# Patient Record
Sex: FEMALE | Race: OTHER | URBAN - METROPOLITAN AREA
[De-identification: names, ages, dates, MRNs, and addresses within clinical notes are randomized per-mention and may not be internally consistent; named-entity substitution may affect disease eponyms.]

---

## 2024-04-04 ENCOUNTER — EMERGENCY (EMERGENCY)
Facility: HOSPITAL | Age: 59
LOS: 1 days | Discharge: HOME | End: 2024-04-04
Admitting: EMERGENCY MEDICINE
Payer: COMMERCIAL

## 2024-04-04 VITALS
RESPIRATION RATE: 19 BRPM | OXYGEN SATURATION: 99 % | WEIGHT: 128.97 LBS | SYSTOLIC BLOOD PRESSURE: 185 MMHG | HEART RATE: 70 BPM | TEMPERATURE: 98 F | DIASTOLIC BLOOD PRESSURE: 92 MMHG

## 2024-04-04 DIAGNOSIS — E87.6 HYPOKALEMIA: ICD-10-CM

## 2024-04-04 DIAGNOSIS — I10 ESSENTIAL (PRIMARY) HYPERTENSION: ICD-10-CM

## 2024-04-04 DIAGNOSIS — R41.82 ALTERED MENTAL STATUS, UNSPECIFIED: ICD-10-CM

## 2024-04-04 DIAGNOSIS — Z86.73 PERSONAL HISTORY OF TRANSIENT ISCHEMIC ATTACK (TIA), AND CEREBRAL INFARCTION WITHOUT RESIDUAL DEFICITS: ICD-10-CM

## 2024-04-04 DIAGNOSIS — I16.0 HYPERTENSIVE URGENCY: ICD-10-CM

## 2024-04-04 LAB
ALBUMIN SERPL ELPH-MCNC: 3.8 G/DL — SIGNIFICANT CHANGE UP (ref 3.4–5)
ALP SERPL-CCNC: 82 U/L — SIGNIFICANT CHANGE UP (ref 40–120)
ALT FLD-CCNC: 33 U/L — SIGNIFICANT CHANGE UP (ref 12–42)
ANION GAP SERPL CALC-SCNC: 15 MMOL/L — SIGNIFICANT CHANGE UP (ref 9–16)
AST SERPL-CCNC: 20 U/L — SIGNIFICANT CHANGE UP (ref 15–37)
BASOPHILS # BLD AUTO: 0.03 K/UL — SIGNIFICANT CHANGE UP (ref 0–0.2)
BASOPHILS NFR BLD AUTO: 0.4 % — SIGNIFICANT CHANGE UP (ref 0–2)
BILIRUB SERPL-MCNC: 0.2 MG/DL — SIGNIFICANT CHANGE UP (ref 0.2–1.2)
BUN SERPL-MCNC: 16 MG/DL — SIGNIFICANT CHANGE UP (ref 7–23)
CALCIUM SERPL-MCNC: 9.3 MG/DL — SIGNIFICANT CHANGE UP (ref 8.5–10.5)
CHLORIDE SERPL-SCNC: 106 MMOL/L — SIGNIFICANT CHANGE UP (ref 96–108)
CO2 SERPL-SCNC: 22 MMOL/L — SIGNIFICANT CHANGE UP (ref 22–31)
CREAT SERPL-MCNC: 0.76 MG/DL — SIGNIFICANT CHANGE UP (ref 0.5–1.3)
EGFR: 92 ML/MIN/1.73M2 — SIGNIFICANT CHANGE UP
EOSINOPHIL # BLD AUTO: 0.09 K/UL — SIGNIFICANT CHANGE UP (ref 0–0.5)
EOSINOPHIL NFR BLD AUTO: 1.3 % — SIGNIFICANT CHANGE UP (ref 0–6)
ETHANOL SERPL-MCNC: 90 MG/DL — HIGH
GLUCOSE SERPL-MCNC: 100 MG/DL — HIGH (ref 70–99)
HCT VFR BLD CALC: 40.6 % — SIGNIFICANT CHANGE UP (ref 34.5–45)
HGB BLD-MCNC: 14.3 G/DL — SIGNIFICANT CHANGE UP (ref 11.5–15.5)
IMM GRANULOCYTES NFR BLD AUTO: 0.3 % — SIGNIFICANT CHANGE UP (ref 0–0.9)
LYMPHOCYTES # BLD AUTO: 1.71 K/UL — SIGNIFICANT CHANGE UP (ref 1–3.3)
LYMPHOCYTES # BLD AUTO: 25.5 % — SIGNIFICANT CHANGE UP (ref 13–44)
MCHC RBC-ENTMCNC: 31 PG — SIGNIFICANT CHANGE UP (ref 27–34)
MCHC RBC-ENTMCNC: 35.2 GM/DL — SIGNIFICANT CHANGE UP (ref 32–36)
MCV RBC AUTO: 87.9 FL — SIGNIFICANT CHANGE UP (ref 80–100)
MONOCYTES # BLD AUTO: 0.53 K/UL — SIGNIFICANT CHANGE UP (ref 0–0.9)
MONOCYTES NFR BLD AUTO: 7.9 % — SIGNIFICANT CHANGE UP (ref 2–14)
NEUTROPHILS # BLD AUTO: 4.33 K/UL — SIGNIFICANT CHANGE UP (ref 1.8–7.4)
NEUTROPHILS NFR BLD AUTO: 64.6 % — SIGNIFICANT CHANGE UP (ref 43–77)
NRBC # BLD: 0 /100 WBCS — SIGNIFICANT CHANGE UP (ref 0–0)
PLATELET # BLD AUTO: 173 K/UL — SIGNIFICANT CHANGE UP (ref 150–400)
POTASSIUM SERPL-MCNC: 3.4 MMOL/L — LOW (ref 3.5–5.3)
POTASSIUM SERPL-SCNC: 3.4 MMOL/L — LOW (ref 3.5–5.3)
PROT SERPL-MCNC: 7.5 G/DL — SIGNIFICANT CHANGE UP (ref 6.4–8.2)
RBC # BLD: 4.62 M/UL — SIGNIFICANT CHANGE UP (ref 3.8–5.2)
RBC # FLD: 11.9 % — SIGNIFICANT CHANGE UP (ref 10.3–14.5)
SODIUM SERPL-SCNC: 143 MMOL/L — SIGNIFICANT CHANGE UP (ref 132–145)
TROPONIN I, HIGH SENSITIVITY RESULT: 19.2 NG/L — SIGNIFICANT CHANGE UP
WBC # BLD: 6.71 K/UL — SIGNIFICANT CHANGE UP (ref 3.8–10.5)
WBC # FLD AUTO: 6.71 K/UL — SIGNIFICANT CHANGE UP (ref 3.8–10.5)

## 2024-04-04 PROCEDURE — 99285 EMERGENCY DEPT VISIT HI MDM: CPT

## 2024-04-04 PROCEDURE — 70450 CT HEAD/BRAIN W/O DYE: CPT | Mod: 26,MC

## 2024-04-04 RX ORDER — FAMOTIDINE 10 MG/ML
20 INJECTION INTRAVENOUS ONCE
Refills: 0 | Status: COMPLETED | OUTPATIENT
Start: 2024-04-04 | End: 2024-04-04

## 2024-04-04 RX ORDER — SODIUM CHLORIDE 9 MG/ML
1000 INJECTION INTRAMUSCULAR; INTRAVENOUS; SUBCUTANEOUS ONCE
Refills: 0 | Status: COMPLETED | OUTPATIENT
Start: 2024-04-04 | End: 2024-04-04

## 2024-04-04 RX ORDER — POTASSIUM CHLORIDE 20 MEQ
40 PACKET (EA) ORAL ONCE
Refills: 0 | Status: COMPLETED | OUTPATIENT
Start: 2024-04-04 | End: 2024-04-04

## 2024-04-04 RX ORDER — ONDANSETRON 8 MG/1
4 TABLET, FILM COATED ORAL ONCE
Refills: 0 | Status: COMPLETED | OUTPATIENT
Start: 2024-04-04 | End: 2024-04-04

## 2024-04-04 RX ADMIN — Medication 40 MILLIEQUIVALENT(S): at 23:56

## 2024-04-04 RX ADMIN — SODIUM CHLORIDE 1000 MILLILITER(S): 9 INJECTION INTRAMUSCULAR; INTRAVENOUS; SUBCUTANEOUS at 23:12

## 2024-04-04 RX ADMIN — ONDANSETRON 4 MILLIGRAM(S): 8 TABLET, FILM COATED ORAL at 23:12

## 2024-04-04 RX ADMIN — FAMOTIDINE 20 MILLIGRAM(S): 10 INJECTION INTRAVENOUS at 23:12

## 2024-04-04 NOTE — ED PROVIDER NOTE - PATIENT PORTAL LINK FT
You can access the FollowMyHealth Patient Portal offered by [facility] by registering at the following website: [email or web address]. By joining [email or web address] FollowMyHealth portal, you will also be able to view your health information using other applications (apps) compatible with our system.

## 2024-04-04 NOTE — ED PROVIDER NOTE - PHYSICAL EXAMINATION
Gen - WDWN F, +AOB, no acute distress  Skin - warm, dry, intact  HEENT - AT/NC, PERRL, mild conjunctival injection, pupils 3mm b/l, TM intact with no hemotympanium b/l, no facial contusion or periorbital ecchymosis, o/p clear, uvula midline, airway patent, neck supple with no step off or midline tenderness, FROM   CV - S1S2, R/R/R  Resp - respiration non-labored, CTAB, symmetric bs b/l, no r/r/w  GI - NABS, soft, ND, NT, no rebound or guarding, no CVAT b/l  MS - w/w/p, no c/c/e, calves supple and NT  Neuro - Alert and awake and oriented x 3, slightly slurred speech, ambulatory with steady gait, no focal deficits, strengths intact and 5/5 BUE/BLE, cerebellar function intact, negative pronator drift, no facial droop

## 2024-04-04 NOTE — ED PROVIDER NOTE - NSFOLLOWUPINSTRUCTIONS_ED_ALL_ED_FT
Hypertension    Hypertension, commonly called high blood pressure, is when the force of blood pumping through your arteries is too strong. Hypertension forces your heart to work harder to pump blood. Your arteries may become narrow or stiff. Having untreated or uncontrolled hypertension for a long period of time can cause heart attack, stroke, kidney disease, and other problems. If started on a medication, take exactly as prescribed by your health care professional. Maintain a healthy lifestyle and follow up with your primary care physician.    SEEK IMMEDIATE MEDICAL CARE IF YOU HAVE ANY OF THE FOLLOWING SYMPTOMS: severe headache, confusion, chest pain, abdominal pain, vomiting, or shortness of breath.       Hypokalemia  Hypokalemia means that the amount of potassium in the blood is lower than normal. Potassium is a mineral (electrolyte) that helps regulate the amount of fluid in the body. It also stimulates muscle tightening (contraction) and helps nerves work properly.    Normally, most of the body's potassium is inside cells, and only a very small amount is in the blood. Because the amount in the blood is so small, minor changes to potassium levels in the blood can be life-threatening.    What are the causes?  This condition may be caused by:  Antibiotic medicine.  Diarrhea or vomiting. Taking too much of a medicine that helps you have a bowel movement (laxative) can cause diarrhea and lead to hypokalemia.  Chronic kidney disease (CKD).  Medicines that help the body get rid of excess fluid (diuretics).  Eating disorders, such as anorexia or bulimia.  Low magnesium levels in the body.  Sweating a lot.  What are the signs or symptoms?  Symptoms of this condition include:  Weakness.  Constipation.  Fatigue.  Muscle cramps.  Mental confusion.  Skipped heartbeats or irregular heartbeat (palpitations).  Tingling or numbness.  How is this diagnosed?  This condition is diagnosed with a blood test.    How is this treated?  This condition may be treated by:  Taking potassium supplements.  Adjusting the medicines that you take.  Eating more foods that contain a lot of potassium.  If your potassium level is very low, you may need to get potassium through an IV and be monitored in the hospital.    Follow these instructions at home:  Eating and drinking    A plate with examples of foods in a healthy diet.  Eat a healthy diet. A healthy diet includes fresh fruits and vegetables, whole grains, healthy fats, and lean proteins.  If told, eat more foods that contain a lot of potassium. These include:  Nuts, such as peanuts and pistachios.  Seeds, such as sunflower seeds and pumpkin seeds.  Peas, lentils, and lima beans.  Whole grain and bran cereals and breads.  Fresh fruits and vegetables, such as apricots, avocado, bananas, cantaloupe, kiwi, oranges, tomatoes, asparagus, and potatoes.  Juices, such as orange, tomato, and prune.  Lean meats, including fish.  Milk and milk products, such as yogurt.  General instructions    Take over-the-counter and prescription medicines only as told by your health care provider. This includes vitamins, natural food products, and supplements.  Keep all follow-up visits. This is important.  Contact a health care provider if:  You have weakness that gets worse.  You feel your heart pounding or racing.  You vomit.  You have diarrhea.  You have diabetes and you have trouble keeping your blood sugar in your target range.  Get help right away if:  You have chest pain.  You have shortness of breath.  You have vomiting or diarrhea that lasts for more than 2 days.  You faint.  These symptoms may be an emergency. Get help right away. Call 911.  Do not wait to see if the symptoms will go away.  Do not drive yourself to the hospital.  Summary  Hypokalemia means that the amount of potassium in the blood is lower than normal.  This condition is diagnosed with a blood test.  Hypokalemia may be treated by taking potassium supplements, adjusting the medicines that you take, or eating more foods that are high in potassium.  If your potassium level is very low, you may need to get potassium through an IV and be monitored in the hospital.  This information is not intended to replace advice given to you by your health care provider. Make sure you discuss any questions you have with your health care provider.

## 2024-04-04 NOTE — ED PROVIDER NOTE - CLINICAL SUMMARY MEDICAL DECISION MAKING FREE TEXT BOX
pt p/w AMS, feeling weird and strange after a few drinks tonight. Noted hypertensive on arrival, NIHSS of 0, AxOx3 with no focal neuro deficits on exam with rapid return to baseline and generalized vague sx after some alcohol consumtion, sx not suggestive of CVA/TIA. Medical w/u here with unremarkable labs except mild hypoK with no EKG changes, s/p oral repletion, EKG non ischemic, CTH nwl, UDS/U/A wnl, serial reassessment with rapid improvement in sx and currently at baseline and BP normalized. Sx may be 2/2 HTN urgency vs mild alcohol/electrolyte disturbance. Based on my personal interpretation of pt's labs/images and entire work up during the ED stay, results, ddx, and f/u plans discussed in length with pt at bedside. AFVSS, pt non-toxic appearing and remained stable throughout the stay after ED interventions. D/c'd home to f/u with PMD and private neuro, strict return precautions discussed, prompt return to ED for any worsening or new sx, pt and partner verbalized understanding.

## 2024-04-04 NOTE — ED PROVIDER NOTE - PROVIDER TOKENS
FREE:[LAST:[please follow up with your PMD/neurologist],PHONE:[(   )    -],FAX:[(   )    -]],PROVIDER:[TOKEN:[[id number]]]

## 2024-04-04 NOTE — ED ADULT TRIAGE NOTE - CHIEF COMPLAINT QUOTE
BIBEMS for ams from a bar. per , pt was having a drink eating something and all of a sudden became altered and felt weird. Endorses left sided numbness/diminished sensation to left side. pmh of htn, and previous TIA  clinical upgrade called to amb triage, PA izaiah by bedside, no code stroke at this time.

## 2024-04-04 NOTE — ED PROVIDER NOTE - CARE PLAN
Atrial fibrillation, unspecified type    B-cell lymphoma, unspecified B-cell lymphoma type, unspecified body region  CLL stage 4/SLL; met NHL  Chronic diastolic congestive heart failure    COPD (chronic obstructive pulmonary disease)    DM type 2 (diabetes mellitus, type 2)    Essential hypertension    Hepatitis B virus infection, unspecified chronicity  retrovirals  Hypogammaglobulinemia  received IVIG Principal Discharge DX:	Altered mental status  Secondary Diagnosis:	Hypertensive urgency  Secondary Diagnosis:	Hypokalemia   1

## 2024-04-04 NOTE — ED PROVIDER NOTE - OBJECTIVE STATEMENT
57 yo F with PMHx of HTN and TIA in the past, BIBA for AMS. Pt reports having some drinks at the bar and noted feeling "weird and unwell," with nausea and generalized weakness. Denies HA, dizziness, numbness, tingling, photophobia, diplopia, change in vision/hearing/speech, focal weakness, neck pain, rash, fever, chills, stiffness, CP, SOB, palpitations, diaphoresis, N/V/D/C, abdominal pain, change in urinary/bowel function, incontinence, seizure like activities, dysuria, hematuria, flank pain, and malaise.

## 2024-04-04 NOTE — ED ADULT NURSE NOTE - OBJECTIVE STATEMENT
pt in for altered mental status, nausea, weakness. mild abdominal discomfort; reports alcohol intake tonight; pt arrives awake, alert & oriented x4, in no acute distress; pt tearful but denies any severe pain; denies fall/injury; denies any numbness on all four extremities; no facial droop; noted slurred speech related to alcohol intoxication; hx HTN, TIA

## 2024-04-04 NOTE — ED PROVIDER NOTE - CARE PROVIDER_API CALL
please follow up with your PMD/neurologist,   Phone: (   )    -  Fax: (   )    -  Follow Up Time:     Uriel Robertson  Neurology  [address] [place] [address]  Phone: [phone]  Fax: [phone]  Follow Up Time:

## 2024-04-05 VITALS
DIASTOLIC BLOOD PRESSURE: 85 MMHG | SYSTOLIC BLOOD PRESSURE: 146 MMHG | HEART RATE: 60 BPM | RESPIRATION RATE: 16 BRPM | TEMPERATURE: 98 F | OXYGEN SATURATION: 96 %

## 2024-04-05 LAB — PCP SPEC-MCNC: SIGNIFICANT CHANGE UP
